# Patient Record
Sex: MALE | Race: WHITE | NOT HISPANIC OR LATINO
[De-identification: names, ages, dates, MRNs, and addresses within clinical notes are randomized per-mention and may not be internally consistent; named-entity substitution may affect disease eponyms.]

---

## 2018-02-22 ENCOUNTER — TRANSCRIPTION ENCOUNTER (OUTPATIENT)
Age: 23
End: 2018-02-22

## 2021-08-24 PROBLEM — Z00.00 ENCOUNTER FOR PREVENTIVE HEALTH EXAMINATION: Status: ACTIVE | Noted: 2021-08-24

## 2021-08-26 ENCOUNTER — APPOINTMENT (OUTPATIENT)
Dept: SURGERY | Facility: CLINIC | Age: 26
End: 2021-08-26

## 2021-10-13 ENCOUNTER — APPOINTMENT (OUTPATIENT)
Dept: SURGERY | Facility: CLINIC | Age: 26
End: 2021-10-13
Payer: COMMERCIAL

## 2021-10-13 VITALS
WEIGHT: 160 LBS | TEMPERATURE: 97.2 F | DIASTOLIC BLOOD PRESSURE: 60 MMHG | HEIGHT: 70 IN | BODY MASS INDEX: 22.9 KG/M2 | HEART RATE: 69 BPM | OXYGEN SATURATION: 97 % | SYSTOLIC BLOOD PRESSURE: 90 MMHG

## 2021-10-13 DIAGNOSIS — Z80.8 FAMILY HISTORY OF MALIGNANT NEOPLASM OF OTHER ORGANS OR SYSTEMS: ICD-10-CM

## 2021-10-13 DIAGNOSIS — Z82.49 FAMILY HISTORY OF ISCHEMIC HEART DISEASE AND OTHER DISEASES OF THE CIRCULATORY SYSTEM: ICD-10-CM

## 2021-10-13 DIAGNOSIS — K40.90 UNILATERAL INGUINAL HERNIA, W/OUT OBSTRUCTION OR GANGRENE, NOT SPECIFIED AS RECURRENT: ICD-10-CM

## 2021-10-13 DIAGNOSIS — Z83.3 FAMILY HISTORY OF DIABETES MELLITUS: ICD-10-CM

## 2021-10-13 DIAGNOSIS — Z78.9 OTHER SPECIFIED HEALTH STATUS: ICD-10-CM

## 2021-10-13 PROCEDURE — 99203 OFFICE O/P NEW LOW 30 MIN: CPT

## 2021-10-14 PROBLEM — K40.90 INGUINAL HERNIA, RIGHT: Noted: 2021-10-13

## 2021-10-14 NOTE — REASON FOR VISIT
[Initial Evaluation] : an initial evaluation [FreeTextEntry1] : regarding "possible right groin hernia..."

## 2021-10-14 NOTE — REVIEW OF SYSTEMS
[As Noted in HPI] : as noted in HPI [Negative] : Heme/Lymph [Feeling Poorly] : not feeling poorly [Feeling Tired] : not feeling tired

## 2021-10-14 NOTE — PLAN
[FreeTextEntry1] : Several month history of lingering right groin pain.  Area concerning for inguinal hernia.\par However, lack of palpable/ visible change somewhat atypical.  Exam today is completely WNL.\par Recent abdominal and scrotal US also reassuring.\par As such, will treat as strain/ sprain for present:\par -Activity with comfort as guide.\par -BID use of NSAIDs\par -Local warm therapy\par RTO in 2 weeks or so to reassess progress and repeat exam.  If complaints persist in setting of negative exam, can then consider more advanced imaging.  Patient pleased and eager to proceed as outlined above.\par Please note that more than 50% of face to face time was spent in counseling and coordination of care.

## 2021-10-14 NOTE — PHYSICAL EXAM
[Respiratory Effort] : normal respiratory effort [Normal Rate and Rhythm] : normal rate and rhythm [No HSM] : no hepatosplenomegaly [No Rash or Lesion] : No rash or lesion [Alert] : alert [Oriented to Person] : oriented to person [Oriented to Place] : oriented to place [Oriented to Time] : oriented to time [Calm] : calm [Tender] : was nontender [Enlarged] : not enlarged [de-identified] : Appears well, no acute distress, ambulates easily into office and assumes examination table without need of assistance.  [de-identified] : Normocephalic and atraumatic.  [de-identified] : Supple with full range of motion.  [FreeTextEntry1] : No cervical, supraclavicular, axillary or inguinal adenopathy.  [de-identified] : No visible lesions or palpable masses. [de-identified] : Epigastrium WNL.\par Periumbilical fascia intact. \par Entire abdomen non tender.\par No surgical scars.\par Bilateral groins intact on cough and Valsalva type maneuvers in both the upright and supine positions throughout the inguinal, femoral and more lateral Spigelian hernia spaces. [de-identified] : Penis free of lesions, cords free of palpable cysts, both testicles free of masses, with right testicle slightly smaller and more high riding than left on my exam. [de-identified] : Deferred.  [de-identified] : Grossly symmetric and within normal limits without any obvious motor or sensory deficits.

## 2021-10-14 NOTE — DATA REVIEWED
[FreeTextEntry1] : Abdominal and Scrotal Ultrasound\par 8/3/2021\par @ [facility]:\par \par +1.4 cm epididymal head cyst\par \par Otherwise, normal studies.  Full reports scanned to [employer].

## 2021-10-14 NOTE — HISTORY OF PRESENT ILLNESS
[de-identified] : Very pleasant young man with several month history of intermittent and low grade but persistent right groin discomfort and sometimes pressure.\par He feels that it is exacerbated by long walks.   However, he denies any issue with coughing/ sneezing or Valsalva type maneuvers.\par He has not appreciated any palpable or visible change in the groin.\par Mr. Fraire denies any associated or ongoing systemic or GI or  complaints.\par Prior to today's visit a sonogram has been done...

## 2021-10-20 ENCOUNTER — APPOINTMENT (OUTPATIENT)
Dept: UROLOGY | Facility: CLINIC | Age: 26
End: 2021-10-20

## 2022-01-28 ENCOUNTER — APPOINTMENT (OUTPATIENT)
Dept: SURGERY | Facility: CLINIC | Age: 27
End: 2022-01-28
Payer: COMMERCIAL

## 2022-01-28 VITALS
WEIGHT: 158.29 LBS | SYSTOLIC BLOOD PRESSURE: 100 MMHG | DIASTOLIC BLOOD PRESSURE: 68 MMHG | BODY MASS INDEX: 22.66 KG/M2 | OXYGEN SATURATION: 98 % | HEIGHT: 70 IN | HEART RATE: 80 BPM | TEMPERATURE: 96.8 F

## 2022-01-28 DIAGNOSIS — R10.813 RIGHT LOWER QUADRANT ABDOMINAL TENDERNESS: ICD-10-CM

## 2022-01-28 DIAGNOSIS — Z92.29 PERSONAL HISTORY OF OTHER DRUG THERAPY: ICD-10-CM

## 2022-01-28 PROCEDURE — 99214 OFFICE O/P EST MOD 30 MIN: CPT

## 2022-01-28 RX ORDER — AMOXICILLIN AND CLAVULANATE POTASSIUM 875; 125 MG/1; MG/1
875-125 TABLET, COATED ORAL
Qty: 20 | Refills: 0 | Status: DISCONTINUED | COMMUNITY
Start: 2021-11-05

## 2022-01-28 NOTE — PHYSICAL EXAM
[Respiratory Effort] : normal respiratory effort [Normal Rate and Rhythm] : normal rate and rhythm [No HSM] : no hepatosplenomegaly [No Rash or Lesion] : No rash or lesion [Alert] : alert [Oriented to Person] : oriented to person [Oriented to Place] : oriented to place [Oriented to Time] : oriented to time [Tender] : was nontender [Enlarged] : not enlarged [Anxious] : anxious [de-identified] : Appears well, no acute distress, ambulates easily into the office.  [de-identified] : Remains normocephalic and atraumatic.  [de-identified] : Still supple with full range of motion.  [FreeTextEntry1] : No palpable cervical, supraclavicular, axillary or inguinal adenopathy.  [de-identified] : No visible lesion or palpable mass. [de-identified] : Epigastrium remains WNL.\par Periumbilical fascia still intact. \par Abdomen mild tenderness to deepest palpation only of inferior RLQ without visible fullness, palpable mass or any guarding/ rebound/ referred pain.\par Bilateral groins still intact on cough and Valsalva type maneuvers in both upright and supine positions throughout the inguinal, femoral and Spigelian hernia spaces. [de-identified] : Penis free of lesions, cords free of palpable cysts, both testicles free of masses, with right testicle slightly smaller and more high riding than left on my exam- all stable. [de-identified] : Deferred.  [de-identified] : Grossly symmetric and within normal limits without motor or sensory deficit.  [de-identified] : though appropriately so...

## 2022-01-28 NOTE — HISTORY OF PRESENT ILLNESS
[de-identified] : Very pleasant young man with several month history of intermittent and low grade but persistent right groin discomfort and sometimes pressure.\par He feels that it is exacerbated by long walks.   However, he denies any issue with coughing/ sneezing or Valsalva type maneuvers.\par He has not appreciated any palpable or visible change in the groin.\par Mr. Fraire denies any associated or ongoing systemic or GI or  complaints.\par Prior to today's visit a sonogram has been done... [de-identified] : Very pleasant young man with several month history of intermittent and low grade but persistent right groin discomfort and sometimes pressure.\par Since last visit, he reports having initially improved with trial of NSAID.\par However, his discomfort/ pressure have now returned.\par He has not appreciated any visible change in the groin, but believes that there may have been a recent episode of palpable fullness on his own exam.\par His GI thought a hernia was present by exam and referred him to a Urologist, who did not appreciate a hernia.\par Mr. Fraire continues to deny any obviously associated or ongoing systemic, GI or  complaints.\par He remains concerned regarding a possible hernia and returns to the office today with his mother for additional General Surgery evaluation...

## 2022-01-28 NOTE — PLAN
[FreeTextEntry1] : Now with more than several month history of lingering right groin pain and now +/- RLQ tenderness.  \par Area concerning for inguinal hernia, especially so given report of palpable change on patient's exam.\par However, exam today is completely WNL showing intact groins, though some appreciable RLQ tenderness without peritoneal findings.\par Previous abdominal and scrotal US also reassuring.\par Yet, now given continued complaints with failure of simple supportive care regimen for presumed strain or sprain, will move forward with CT scan to further assess.\par Mr. Jansen is pleased and agrees.\par He and his mother leave in good spirits, prepared to proceed as outlined above.\par Please note that more than 50% of face to face time was spent in counseling and coordination of care.

## 2022-01-28 NOTE — REVIEW OF SYSTEMS
[As Noted in HPI] : as noted in HPI [Anxiety] : anxiety [Negative] : Heme/Lymph [Feeling Poorly] : not feeling poorly [Feeling Tired] : not feeling tired [Depression] : no depression [de-identified] : regarding this issue and visit...

## 2022-01-28 NOTE — REASON FOR VISIT
[Follow-Up: _____] : a [unfilled] follow-up visit [Initial Evaluation] : an initial evaluation [FreeTextEntry1] : regarding "possible right groin hernia..."

## 2022-05-08 ENCOUNTER — RESULT REVIEW (OUTPATIENT)
Age: 27
End: 2022-05-08

## 2022-05-14 ENCOUNTER — OUTPATIENT (OUTPATIENT)
Dept: OUTPATIENT SERVICES | Facility: HOSPITAL | Age: 27
LOS: 1 days | End: 2022-05-14
Payer: COMMERCIAL

## 2022-05-14 ENCOUNTER — TRANSCRIPTION ENCOUNTER (OUTPATIENT)
Age: 27
End: 2022-05-14

## 2022-05-14 ENCOUNTER — APPOINTMENT (OUTPATIENT)
Dept: CT IMAGING | Facility: CLINIC | Age: 27
End: 2022-05-14
Payer: COMMERCIAL

## 2022-05-14 DIAGNOSIS — R10.31 RIGHT LOWER QUADRANT PAIN: ICD-10-CM

## 2022-05-14 DIAGNOSIS — R10.813 RIGHT LOWER QUADRANT ABDOMINAL TENDERNESS: ICD-10-CM

## 2022-05-14 PROCEDURE — 74177 CT ABD & PELVIS W/CONTRAST: CPT

## 2022-05-14 PROCEDURE — 74177 CT ABD & PELVIS W/CONTRAST: CPT | Mod: 26

## 2022-05-16 ENCOUNTER — NON-APPOINTMENT (OUTPATIENT)
Age: 27
End: 2022-05-16

## 2022-08-22 ENCOUNTER — NON-APPOINTMENT (OUTPATIENT)
Age: 27
End: 2022-08-22

## 2023-01-27 ENCOUNTER — APPOINTMENT (OUTPATIENT)
Dept: ORTHOPEDIC SURGERY | Facility: CLINIC | Age: 28
End: 2023-01-27

## 2023-01-31 ENCOUNTER — APPOINTMENT (OUTPATIENT)
Dept: ORTHOPEDIC SURGERY | Facility: CLINIC | Age: 28
End: 2023-01-31

## 2023-02-02 ENCOUNTER — APPOINTMENT (OUTPATIENT)
Dept: ORTHOPEDIC SURGERY | Facility: CLINIC | Age: 28
End: 2023-02-02

## 2023-02-15 ENCOUNTER — APPOINTMENT (OUTPATIENT)
Dept: ORTHOPEDIC SURGERY | Facility: CLINIC | Age: 28
End: 2023-02-15
Payer: COMMERCIAL

## 2023-02-15 VITALS — WEIGHT: 158 LBS | HEIGHT: 70 IN | BODY MASS INDEX: 22.62 KG/M2

## 2023-02-15 DIAGNOSIS — G89.29 RIGHT LOWER QUADRANT PAIN: ICD-10-CM

## 2023-02-15 DIAGNOSIS — R10.31 RIGHT LOWER QUADRANT PAIN: ICD-10-CM

## 2023-02-15 PROCEDURE — 72100 X-RAY EXAM L-S SPINE 2/3 VWS: CPT

## 2023-02-15 PROCEDURE — 73502 X-RAY EXAM HIP UNI 2-3 VIEWS: CPT

## 2023-02-15 PROCEDURE — 99203 OFFICE O/P NEW LOW 30 MIN: CPT

## 2023-02-15 RX ORDER — AZELASTINE HYDROCHLORIDE 205.5 UG/1
0.15 SPRAY, METERED NASAL
Qty: 30 | Refills: 0 | Status: DISCONTINUED | COMMUNITY
Start: 2021-11-05 | End: 2023-02-15

## 2023-02-15 RX ORDER — IPRATROPIUM BROMIDE 42 UG/1
0.06 SPRAY NASAL
Qty: 15 | Refills: 0 | Status: DISCONTINUED | COMMUNITY
Start: 2021-11-05 | End: 2023-02-15

## 2023-02-15 NOTE — PHYSICAL EXAM
[Right] : right hip [5___] : adduction 5[unfilled]/5 [] : no pain with log rolling [FreeTextEntry8] : pubic tubercle tenderness.

## 2023-02-15 NOTE — HISTORY OF PRESENT ILLNESS
[Gradual] : gradual [1] : 2 [3] : 3 [Dull/Aching] : dull/aching [Tingling] : tingling [Constant] : constant [Household chores] : household chores [Leisure] : leisure [Heat] : heat [Walking/activity] : walking/activity [Sitting] : sitting [Standing] : standing [Full time] : Work status: full time [de-identified] : 2/15/23: 26 y/o male c/o intermittent right groin pain since 5/2022. No specific injury. Has been worse over the past 3 months. Describes pressure/tightness in the right groin with occasional pain in the low back and posterior thigh. Intermittent numbness. Worse with prolonged standing or sitting. Saw a hernia specialist, who ordered a CT and prescribed Ibuprofen (temporary relief).\par recalls 3 yrs ago tearing sensation some time after weight lifting. \par \par Occupation:  at [employer] [] : Post Surgical Visit: no [FreeTextEntry1] : RT Hip [FreeTextEntry5] : This is a 26 y/o M here for right hip pain for 1.5 yrs, which has worsened in the last 2-3 mos with no specific injury. No hx of sx to the right hip. No numbness. Has tingling in the hip. Pain radiates into groin. Pt notes less pain in the morning after waking up.  [FreeTextEntry7] : groin [FreeTextEntry9] : laying down [de-identified] : May 2022 [de-identified] : Naveed Benton MD [de-identified] : CT Abd + Pelvis [de-identified] : none [de-identified] :

## 2023-11-07 ENCOUNTER — NON-APPOINTMENT (OUTPATIENT)
Age: 28
End: 2023-11-07

## 2024-04-16 ENCOUNTER — OUTPATIENT (OUTPATIENT)
Dept: OUTPATIENT SERVICES | Facility: HOSPITAL | Age: 29
LOS: 1 days | End: 2024-04-16
Payer: COMMERCIAL

## 2024-04-16 DIAGNOSIS — J45.20 MILD INTERMITTENT ASTHMA, UNCOMPLICATED: ICD-10-CM

## 2024-04-16 PROCEDURE — 94070 EVALUATION OF WHEEZING: CPT

## 2024-04-16 PROCEDURE — 94070 EVALUATION OF WHEEZING: CPT | Mod: 26

## 2024-04-16 RX ORDER — ALBUTEROL 90 UG/1
2 AEROSOL, METERED ORAL ONCE
Refills: 0 | Status: DISCONTINUED | OUTPATIENT
Start: 2024-04-16 | End: 2024-04-30

## 2024-11-07 ENCOUNTER — APPOINTMENT (OUTPATIENT)
Dept: ELECTROPHYSIOLOGY | Facility: CLINIC | Age: 29
End: 2024-11-07

## 2025-08-21 ENCOUNTER — NON-APPOINTMENT (OUTPATIENT)
Age: 30
End: 2025-08-21